# Patient Record
Sex: FEMALE | Employment: UNEMPLOYED | URBAN - METROPOLITAN AREA
[De-identification: names, ages, dates, MRNs, and addresses within clinical notes are randomized per-mention and may not be internally consistent; named-entity substitution may affect disease eponyms.]

---

## 2020-11-23 ENCOUNTER — TREATMENT PLANNING (OUTPATIENT)
Dept: SURGERY | Facility: CLINIC | Age: 80
End: 2020-11-23

## 2020-11-23 NOTE — PROGRESS NOTES
I spoke to Dr Talavera and reviewed her records. 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 CT also show a small amount of ascites, suggesting the possibility of carcinomatosis, although this has not been proven at this point.   She has, per Dr Talavera, severe aortic stenosis, and her clinical notes also indicate the presence of sacral decubitus. Her oncologist, Dr Donaldson, has told her she is not a candidate for neoadjuvant chemotherapy. Dr Talavera does not think she is a candidate for surgical resection (total gastrectomy), but the patient's daughter does not accept this and wants a second opinion.

## 2020-11-24 ENCOUNTER — TELEPHONE (OUTPATIENT)
Dept: SURGERY | Facility: CLINIC | Age: 80
End: 2020-11-24

## 2020-11-24 NOTE — TELEPHONE ENCOUNTER
----- Message from Marleni Paz RN sent at 11/24/2020 10:53 AM CST -----  Regarding: FW: Missed Call    ----- Message -----  From: Dafne Cortez  Sent: 11/24/2020   9:27 AM CST  To: Chanel URRUT[facility] Staff  Subject: Missed Call                                      Shelia Wynn, Patient Daughter, [phone]  States that she is returning a missed call she received on this morning, 11/24/20.  Believe it may be in regards to patient scheduling to see doctor.